# Patient Record
Sex: MALE | Employment: UNEMPLOYED | URBAN - METROPOLITAN AREA
[De-identification: names, ages, dates, MRNs, and addresses within clinical notes are randomized per-mention and may not be internally consistent; named-entity substitution may affect disease eponyms.]

---

## 2024-02-07 ENCOUNTER — OFFICE VISIT (OUTPATIENT)
Dept: ORTHOPEDIC SURGERY | Facility: CLINIC | Age: 34
End: 2024-02-07
Payer: COMMERCIAL

## 2024-02-07 ENCOUNTER — HOSPITAL ENCOUNTER (OUTPATIENT)
Dept: RADIOLOGY | Facility: CLINIC | Age: 34
Discharge: HOME | End: 2024-02-07
Payer: COMMERCIAL

## 2024-02-07 VITALS — WEIGHT: 200 LBS | HEIGHT: 70 IN | BODY MASS INDEX: 28.63 KG/M2

## 2024-02-07 DIAGNOSIS — M79.642 LEFT HAND PAIN: Primary | ICD-10-CM

## 2024-02-07 DIAGNOSIS — S62.353A NONDISPLACED FRACTURE OF SHAFT OF THIRD METACARPAL BONE, LEFT HAND, INITIAL ENCOUNTER FOR CLOSED FRACTURE: ICD-10-CM

## 2024-02-07 DIAGNOSIS — M79.642 LEFT HAND PAIN: ICD-10-CM

## 2024-02-07 PROCEDURE — 73130 X-RAY EXAM OF HAND: CPT | Mod: LT

## 2024-02-07 PROCEDURE — 73130 X-RAY EXAM OF HAND: CPT | Mod: LEFT SIDE | Performed by: RADIOLOGY

## 2024-02-07 PROCEDURE — 26600 TREAT METACARPAL FRACTURE: CPT | Performed by: ORTHOPAEDIC SURGERY

## 2024-02-07 PROCEDURE — L3908 WHO COCK-UP NONMOLDE PRE OTS: HCPCS | Performed by: ORTHOPAEDIC SURGERY

## 2024-02-07 NOTE — PROGRESS NOTES
Subjective    Patient ID: Rc Rogers is a 33 y.o. male.    Chief Complaint: OTHER (LT 3RD & 4TH FINGER PAIN/)     Last Surgery: No surgery found  Last Surgery Date: No surgery found    HPI  Patient is a 33-year-old right-hand-dominant male who comes in after sustaining an injury to his left hand on February 3, 2024.  The injury occurred when he was playing basketball.  Another player hit him directly at his left hand with his wrist extended.  He felt a pop and had pain in the middle of his left hand.  He comes in today for follow-up.  He originally was seen at an outside emergency department.  He was told at that time x-rays showed no fracture.  He did bring in a paper copy of the x-ray however which looked to show a nondisplaced left third metacarpal shaft fracture.    Objective   Ortho Exam  Patient is in no acute distress.  Exam of his left hand and wrist reveals the skin envelope is intact.  He has tenderness directly over the third metacarpal.  He has no tenderness over the second or fourth metacarpals.  He has full extension and flexion in his left middle finger with no malrotation.    Image Results:  New x-rays were personally obtained and reviewed today.  They show a minimally displaced spiral fracture through his third metacarpal shaft.    Assessment/Plan   Encounter Diagnoses:  Left hand pain    Nondisplaced fracture of shaft of third metacarpal bone, left hand, initial encounter for closed fracture    Orders Placed This Encounter    Wrist splint    XR hand left 3+ views     Patient has a left third metacarpal fracture that can be treated conservatively.  He was placed into a brace.  He will follow-up in about 10 days with x-rays of his left hand.

## 2024-02-15 PROBLEM — S62.353D: Status: ACTIVE | Noted: 2024-02-15

## 2024-02-19 ENCOUNTER — APPOINTMENT (OUTPATIENT)
Dept: ORTHOPEDIC SURGERY | Facility: CLINIC | Age: 34
End: 2024-02-19
Payer: COMMERCIAL

## 2024-02-21 ENCOUNTER — HOSPITAL ENCOUNTER (OUTPATIENT)
Dept: RADIOLOGY | Facility: CLINIC | Age: 34
Discharge: HOME | End: 2024-02-21
Payer: COMMERCIAL

## 2024-02-21 ENCOUNTER — OFFICE VISIT (OUTPATIENT)
Dept: ORTHOPEDIC SURGERY | Facility: CLINIC | Age: 34
End: 2024-02-21
Payer: COMMERCIAL

## 2024-02-21 VITALS — HEIGHT: 70 IN | BODY MASS INDEX: 28.63 KG/M2 | WEIGHT: 200 LBS

## 2024-02-21 DIAGNOSIS — S62.353D: ICD-10-CM

## 2024-02-21 DIAGNOSIS — S62.353D: Primary | ICD-10-CM

## 2024-02-21 PROCEDURE — 73130 X-RAY EXAM OF HAND: CPT | Mod: LT

## 2024-02-21 PROCEDURE — 99024 POSTOP FOLLOW-UP VISIT: CPT | Performed by: ORTHOPAEDIC SURGERY

## 2024-02-21 PROCEDURE — 73130 X-RAY EXAM OF HAND: CPT | Mod: LEFT SIDE | Performed by: RADIOLOGY

## 2024-02-21 NOTE — PROGRESS NOTES
Subjective    Patient ID: Rc Rogers is a 33 y.o. male.    Chief Complaint: Other (F/U LT 3RD  FX/DOI: 2/3/24/)     Last Surgery: No surgery found  Last Surgery Date: No surgery found    HPI  Patient comes in for follow-up of his left third metacarpal shaft fracture.  This is being treated conservatively.  He states his pain has been decreasing since his last visit.    Objective   Ortho Exam  Patient is in no acute distress.  Exam of his left hand reveals he still has mild swelling.  He still has tenderness over the third metacarpal shaft.  He has full extension and flexion in his left middle finger.  There is no malrotation noted.    Image Results:  X-rays of his left hand were personally reviewed today.  They show he is maintaining adequate at the fracture site.  There is early callus forming more noticeably on the ulnar aspect of the bone.    Assessment/Plan   Encounter Diagnoses:  Patient has a healing left third metacarpal shaft fracture.  He will continue with conservative management.  He will follow-up in 4 weeks with x-rays of his left hand.

## 2024-03-20 ENCOUNTER — HOSPITAL ENCOUNTER (OUTPATIENT)
Dept: RADIOLOGY | Facility: CLINIC | Age: 34
Discharge: HOME | End: 2024-03-20
Payer: COMMERCIAL

## 2024-03-20 ENCOUNTER — OFFICE VISIT (OUTPATIENT)
Dept: ORTHOPEDIC SURGERY | Facility: CLINIC | Age: 34
End: 2024-03-20
Payer: COMMERCIAL

## 2024-03-20 DIAGNOSIS — S62.353D: ICD-10-CM

## 2024-03-20 DIAGNOSIS — S62.353D: Primary | ICD-10-CM

## 2024-03-20 PROCEDURE — 73130 X-RAY EXAM OF HAND: CPT | Mod: LT

## 2024-03-20 PROCEDURE — 99024 POSTOP FOLLOW-UP VISIT: CPT | Performed by: ORTHOPAEDIC SURGERY

## 2024-03-20 PROCEDURE — 73130 X-RAY EXAM OF HAND: CPT | Mod: LEFT SIDE | Performed by: RADIOLOGY

## 2024-03-20 NOTE — PROGRESS NOTES
Subjective    Patient ID: Rc Rogers is a 33 y.o. male.    Chief Complaint: Follow-up of the Left Hand (L 3RD METACARPAL FX)     Last Surgery: No surgery found  Last Surgery Date: No surgery found    HPI patient comes in for follow-up of his left third metacarpal shaft fracture.  This was treated conservatively.  He has just mild pain.    Objective   Ortho Exam  Patient is in no acute distress.  Exam of his left hand reveals he has full range of motion in his left middle finger.  There is no malrotation with flexion.  He is minimally tender to palpation over the fracture site.    Image Results:  X-rays of his left hand were personally reviewed today.  He has evidence of abundant callus formation at the fracture site.  He has maintained adequate alignment at the fracture site.    Assessment/Plan   Encounter Diagnoses:  Closed nondisplaced fracture of shaft of third metacarpal bone of left hand with routine healing    Orders Placed This Encounter    XR hand left 3+ views     Patient has a healed left third metacarpal shaft fracture.  He may discontinue his brace.  He may begin using his left hand is much as he can tolerate.  He will follow-up as his symptoms dictate.

## 2024-04-01 PROBLEM — R21 RASH OF PENIS: Status: RESOLVED | Noted: 2017-03-18 | Resolved: 2024-04-01

## 2024-04-01 PROBLEM — A60.02 GENITAL HERPES IN MEN: Status: ACTIVE | Noted: 2024-04-01

## 2024-04-01 PROBLEM — S62.328A: Status: ACTIVE | Noted: 2024-02-15

## 2024-04-01 PROBLEM — N34.2 URETHRITIS: Status: ACTIVE | Noted: 2024-04-01

## 2024-04-01 PROBLEM — Z86.19 HISTORY OF VIRAL INFECTION: Status: ACTIVE | Noted: 2024-04-01

## 2024-04-01 RX ORDER — AZITHROMYCIN 500 MG/1
TABLET, FILM COATED ORAL
COMMUNITY
Start: 2021-01-03